# Patient Record
(demographics unavailable — no encounter records)

---

## 2024-12-27 NOTE — PHYSICAL EXAM
[2+] : left foot dorsalis pedis 2+ [No Joint Swelling] : no joint swelling [Normal Foot/Ankle] : Both lower extremities were exposed and visualized. Standing exam demonstrates normal foot posture and alignment. Hindfoot exam shows no hindfoot valgus or varus [Sensation] : the sensory exam was normal to light touch and pinprick [No Focal Deficits] : no focal deficits [Motor Exam] : the motor exam was normal [Deep Tendon Reflexes (DTR)] : deep tendon reflexes were 2+ and symmetric [Ankle Swelling (On Exam)] : not present [Varicose Veins Of Lower Extremities] : not present [] : not present [FreeTextEntry3] : CFT: 3 seconds x 10.  Temperature gradient: warm to cool. [FreeTextEntry1] : Bilateral plantar xerosis present.  Right submet. head 2, painful porokeratosis present with no ulceration or infection underneath.   Right 3rd toenail thickened up to 2mm with yellow subungual debris with pain on palpation.

## 2024-12-27 NOTE — PHYSICAL EXAM
[2+] : left foot dorsalis pedis 2+ [No Joint Swelling] : no joint swelling [Normal Foot/Ankle] : Both lower extremities were exposed and visualized. Standing exam demonstrates normal foot posture and alignment. Hindfoot exam shows no hindfoot valgus or varus [Sensation] : the sensory exam was normal to light touch and pinprick [No Focal Deficits] : no focal deficits [Deep Tendon Reflexes (DTR)] : deep tendon reflexes were 2+ and symmetric [Motor Exam] : the motor exam was normal [Ankle Swelling (On Exam)] : not present [Varicose Veins Of Lower Extremities] : not present [] : not present [FreeTextEntry3] : CFT: 3 seconds x 10.  Temperature gradient: warm to cool. [FreeTextEntry1] : Bilateral plantar xerosis present.  Right submet. head 2, painful porokeratosis present with no ulceration or infection underneath.   Right 3rd toenail thickened up to 2mm with yellow subungual debris with pain on palpation.

## 2024-12-27 NOTE — HISTORY OF PRESENT ILLNESS
[FreeTextEntry1] : Patient returns today with multiple complaints.  She reports right 3rd digit distal nail yellowing and thickening that started over the past year.  Patient has a history of onychomycosis of the right hallux, which was treated with topicals.  She has not had any antifungals to apply to her nails recently.  She is also complaining of right submet. head 2 painful skin lesion that has been present for several years; however, worsening recently in pain.   Patient does stand for prolonged periods of time.  She also complains of dry feet.  Patient states that she needs a new home slipper as well.  Denies any new medical changes.

## 2024-12-27 NOTE — ASSESSMENT
[FreeTextEntry1] : Impression: Painful onychomycosis (B35.1) (M79.674).  Porokeratosis, painful (Q82.8) (M79.671).  Xerosis (L85.3).    Treatment: Discussed etiology and treatment options.   For onychomycosis, right 3rd toenail was debrided of excessive thickness and length to appropriate levels of comfort and contour using sterile nippers and Dremel.   A nail specimen was taken for fungal analysis.  The procedure was tolerated well without complications.  Patient was advised to use topical Ciclopirox on the nails.  Discussed that the length of treatment will be determined in 3 months, pending analysis.   For porokeratosis, advised patient to wear supportive well-padded shoe gear.  Avoid barefoot walking.  Moisturize consistently.  Ammonium Lactate was prescribed to be used on the porokeratosis and the xerosis as a moisturizer. The porokeratosis was wiped with alcohol and enucleated using a sterile #15 blade.  Advised patient to gently exfoliate in the shower with a pumice stone to control symptoms at home.  Discussed that there is a risk of recurrence. Return: 3 months.